# Patient Record
Sex: FEMALE | Race: WHITE | NOT HISPANIC OR LATINO | Employment: STUDENT | URBAN - METROPOLITAN AREA
[De-identification: names, ages, dates, MRNs, and addresses within clinical notes are randomized per-mention and may not be internally consistent; named-entity substitution may affect disease eponyms.]

---
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
## 2024-11-22 ENCOUNTER — TELEPHONE (OUTPATIENT)
Dept: OBSTETRICS AND GYNECOLOGY | Age: 19
End: 2024-11-22
Payer: COMMERCIAL

## 2024-11-22 NOTE — TELEPHONE ENCOUNTER
Pt is scheduled w/Dr. Spears on 11/27/2024 for Nexplanon removal.  LMTC if she is wanting to have a new Nexplanon inserted that day.  She will need her benefits checked prior to the appt.

## 2024-11-27 ENCOUNTER — OFFICE VISIT (OUTPATIENT)
Dept: OBSTETRICS AND GYNECOLOGY | Age: 19
End: 2024-11-27
Payer: COMMERCIAL

## 2024-11-27 VITALS
BODY MASS INDEX: 23.29 KG/M2 | DIASTOLIC BLOOD PRESSURE: 62 MMHG | SYSTOLIC BLOOD PRESSURE: 116 MMHG | HEIGHT: 64 IN | WEIGHT: 136.4 LBS

## 2024-11-27 DIAGNOSIS — Z00.00 WELL WOMAN EXAM WITHOUT GYNECOLOGICAL EXAM: Primary | ICD-10-CM

## 2024-11-27 DIAGNOSIS — Z11.3 SCREEN FOR STD (SEXUALLY TRANSMITTED DISEASE): ICD-10-CM

## 2024-11-27 DIAGNOSIS — Z30.46 ENCOUNTER FOR REMOVAL AND REINSERTION OF NEXPLANON: ICD-10-CM

## 2024-11-27 LAB
B-HCG UR QL: NEGATIVE
EXPIRATION DATE: NORMAL
INTERNAL NEGATIVE CONTROL: NORMAL
INTERNAL POSITIVE CONTROL: NORMAL
Lab: NORMAL

## 2024-11-27 RX ORDER — LISDEXAMFETAMINE DIMESYLATE 20 MG/1
20 CAPSULE ORAL DAILY
COMMUNITY

## 2024-11-27 NOTE — PROGRESS NOTES
[facility]   Obstetrics and Gynecology   Routine Annual Visit    2024    Patient: Rosie Interiano          MR#:[id number]    History of Present Illness    Chief Complaint   Patient presents with    Contraception     Reestablish care - Nexplanon removal today, LOV & Nexplanon 10/21/2021, Wants to discuss IUD       19 y.o. female  who presents for annual exam.  Nexplanon was placed 10/21/2021.  Amenorrheic until recently.  She would like to discuss other contraceptive options.    S/p gardasil series      Obstetric History:  OB History          0    Para   0    Term   0       0    AB   0    Living   0         SAB   0    IAB   0    Ectopic   0    Molar   0    Multiple   0    Live Births   0               Menstrual History:     No LMP recorded (lmp unknown). Patient has had an implant.       Sexual History:   Sexually active with male and female partners, desires STD screening today, declined serum      ________________________________________  Patient Active Problem List   Diagnosis    Major depressive disorder, recurrent severe without psychotic features    PTSD (post-traumatic stress disorder)    Self-mutilation    ADHD (attention deficit hyperactivity disorder), inattentive type    Tobacco abuse     Past Medical History:   Diagnosis Date    ADHD (attention deficit hyperactivity disorder)     Depression     PTSD (post-traumatic stress disorder)      Past Surgical History:   Procedure Laterality Date    OTHER SURGICAL HISTORY      nexplanon     SCAR REVISION      WISDOM TOOTH EXTRACTION Bilateral      Social History     Tobacco Use   Smoking Status Some Days    Types: Cigarettes    Passive exposure: Never   Smokeless Tobacco Never   Tobacco Comments    vape     Family History   Problem Relation Age of Onset    Ulcers Father     No Known Problems Mother     Dementia Paternal Grandmother     Coronary artery disease Maternal Grandfather     Ovarian cancer Paternal Aunt      "Breast cancer Neg Hx     Uterine cancer Neg Hx     Colon cancer Neg Hx      Prior to Admission medications    Medication Sig Start Date End Date Taking? Authorizing Provider   Etonogestrel (Nexplanon) 68 MG implant subdermal implant To be inserted one time by prescriber. Route Subdermal. 11/27/24  Yes Lilia Spears MD   lisdexamfetamine (VYVANSE) 20 MG capsule Take 1 capsule by mouth Daily  Patient not taking: Reported on 11/27/2024    Eduard Chavez MD   buPROPion XL (WELLBUTRIN XL) 150 MG 24 hr tablet Take 150 mg by mouth Every Morning. 9/29/21 11/27/24  Eduard Chavez MD   lamoTRIgine (LaMICtal) 150 MG tablet Every Night. 9/14/21 11/27/24  Eduard Chavez MD   lamoTRIgine (LaMICtal) 25 MG tablet 25 mg Every Night. 9/29/21 11/27/24  Eduard Chavez MD   Vyvanse 20 MG capsule  9/27/21 11/27/24  Eduard Chavez MD     ________________________________________    Current contraception: Nexplanon  History of abnormal Pap smear: no  Family history of uterine or ovarian cancer: yes - see above  Family History of colon cancer/colon polyps: no  History of abnormal mammogram: no    The following portions of the patient's history were reviewed and updated as appropriate: allergies, current medications, past family history, past medical history, past social history, past surgical history, and problem list.    Review of Systems   All other systems reviewed and are negative.           Objective     /62   Ht 162.6 cm (64\")   Wt 61.9 kg (136 lb 6.4 oz)   LMP  (LMP Unknown) Comment: Nexplanon 10/21/2021  Breastfeeding No   BMI 23.41 kg/m²    BP Readings from Last 3 Encounters:   11/27/24 116/62   07/13/22 (!) 124/84 (89%, Z = 1.23 /  98%, Z = 2.05)*   10/21/21 104/66 (26%, Z = -0.64 /  53%, Z = 0.08)*     *BP percentiles are based on the 2017 AAP Clinical Practice Guideline for girls      Wt Readings from Last 3 Encounters:   11/27/24 61.9 kg (136 lb 6.4 oz)   07/13/22 60.8 kg " "(134 lb) (68%, Z= 0.46)*   10/21/21 68.6 kg (151 lb 3.2 oz) (86%, Z= 1.10)*     * Growth percentiles are based on CDC (Girls, 2-20 Years) data.        BMI: Estimated body mass index is 23.41 kg/m² as calculated from the following:    Height as of this encounter: 162.6 cm (64\").    Weight as of this encounter: 61.9 kg (136 lb 6.4 oz).    Physical Exam  Vitals and nursing note reviewed.   Constitutional:       General: She is not in acute distress.  Cardiovascular:      Pulses: Normal pulses.      Heart sounds: Normal heart sounds. No murmur heard.  Pulmonary:      Effort: Pulmonary effort is normal. No respiratory distress.   Abdominal:      General: There is no distension.      Palpations: Abdomen is soft.      Tenderness: There is no abdominal tenderness.   Musculoskeletal:         General: Normal range of motion.   Neurological:      General: No focal deficit present.      Mental Status: She is alert and oriented to person, place, and time.   Psychiatric:         Mood and Affect: Mood normal.         Behavior: Behavior normal.         Thought Content: Thought content normal.         Judgment: Judgment normal.         As part of wellness and prevention, the following topics were discussed with the patient:  Encouraged self breast exam  Physical activity and regular exercised encouraged.   Injury prevention discussed.  Healthy weight discussed.  Nutrition discussed.  Substance abuse/misuse discussed.  Sexual behavior/safe practices discussed.   Sexual transmitted disease prevention   Contraception discussed.   Mental health discussed.   Vaccinations/immunizations addressed.         Patient is a smoker.    Nexplanon Removal and Reinsertion procedure Note  Date of Insertion:  known  Date of Removal:  November 27, 2024    Information related to removal of the implant:   Reason(s) for removal:  Product life completed and desires placement of new device   Was implant palpable before removal?  Yes    Procedure Time Out " Documentation  The risks of the procedure were reviewed with the patient including bleeding, infection and unlikely damage to the insertion site and the benefits of the procedure were explained to the patient and verbal informed consent was obtained.  Time out was performed.    Procedure:    Implant identified.  Left upper arm prepped with alcohol prep pad.  1% lidocaine with epinephrine was injected at planned incision site.  A vertical 3mm incision was performed with scalpel at the distal end of implant.  The implant was removed using a hemostat and pop-out technique. The implant was inspected and found to be intact and complete.  Area was again cleansed with Betadine.  New device was inserted in standard fashion.  Steri strips and a pressure dressing were applied to the site.  After removal instructions were given and verbally reviewed with the patient who acknowledged her understanding.    There were no difficulties with removal and patient tolerated well.    Procedure time: 7 minutes    Lot #[id number]  Exp date 10/2026    Assessment:  Diagnoses and all orders for this visit:    1. Well woman exam without gynecological exam (Primary)  -     Chlamydia trachomatis, Neisseria gonorrhoeae, Trichomonas vaginalis, PCR - Urine, Urine, Clean Catch    2. Encounter for removal and reinsertion of Nexplanon  -     POC Pregnancy, Urine  -     Discontinue: Etonogestrel (NEXPLANON) 68 MG subdermal implant  -     Etonogestrel (NEXPLANON) 68 MG subdermal implant    3. Screen for STD (sexually transmitted disease)  -     Chlamydia trachomatis, Neisseria gonorrhoeae, Trichomonas vaginalis, PCR - Urine, Urine, Clean Catch      -STD screen today, declined serum  - Discussed all contraceptive options.  Ultimately patient opted for Nexplanon removal and reinsertion which was performed without complication.  She thinks she will consider an IUD after this one expires.  - S/p gardasil.  Discussed recommendation to start cervical cancer  screening next year.  Discussed pelvic exam.    Plan:  Return in about 1 year (around 11/27/2025) for Annual.      Lilia Spears MD  11/27/2024 11:10 EST

## 2024-11-30 LAB
C TRACH RRNA SPEC QL NAA+PROBE: NEGATIVE
N GONORRHOEA RRNA SPEC QL NAA+PROBE: NEGATIVE
T VAGINALIS RRNA SPEC QL NAA+PROBE: NEGATIVE